# Patient Record
Sex: MALE | URBAN - METROPOLITAN AREA
[De-identification: names, ages, dates, MRNs, and addresses within clinical notes are randomized per-mention and may not be internally consistent; named-entity substitution may affect disease eponyms.]

---

## 2018-03-16 ENCOUNTER — HOSPITAL ENCOUNTER (EMERGENCY)
Facility: MEDICAL CENTER | Age: 58
End: 2018-03-17
Attending: EMERGENCY MEDICINE
Payer: MEDICARE

## 2018-03-16 VITALS
BODY MASS INDEX: 30.48 KG/M2 | DIASTOLIC BLOOD PRESSURE: 62 MMHG | OXYGEN SATURATION: 98 % | TEMPERATURE: 98 F | HEIGHT: 72 IN | WEIGHT: 225 LBS | SYSTOLIC BLOOD PRESSURE: 146 MMHG | RESPIRATION RATE: 20 BRPM | HEART RATE: 82 BPM

## 2018-03-16 DIAGNOSIS — F23 ACUTE PSYCHOSIS (HCC): ICD-10-CM

## 2018-03-16 PROCEDURE — 99283 EMERGENCY DEPT VISIT LOW MDM: CPT

## 2018-03-17 LAB — POC BREATHALIZER: 0.18 PERCENT (ref 0–0.01)

## 2018-03-17 PROCEDURE — 99283 EMERGENCY DEPT VISIT LOW MDM: CPT

## 2018-03-17 PROCEDURE — 302970 POC BREATHALIZER: Performed by: EMERGENCY MEDICINE

## 2018-03-17 NOTE — ED PROVIDER NOTES
ED Provider Note    ED Provider Note      Primary care provider: No primary care provider on file.    CHIEF COMPLAINT  Chief Complaint   Patient presents with   • Psych Eval     Pt. states needing help with mental health. Pt. unwilling to answer any other questions in triage. Pt. cornered triage RN in corner without answering any additional questions including SI/HI. Pt. smells heavily of ETOH. Pt. bib [facility]. Per report from [facility], pt. has hx of schizophrenia. Pt. uncooperative in triage room security called to triage to assist with pt. before pt. walked back into to ER lobby. Charge RN informed of incident. Pt. roomed in [facility].       HPI  Franklyn Braun is a 57 y.o. male who presents to the Emergency Department with chief complaint of  acute psychosis. Patient states history of schizophrenia has been off his medication for multiple months. Has been having increasing altered mental status he reports increasing audio hallucinations with [employer]ist voices. States the pain nontender no harm himself #him to harm anyone else however they are getting increasingly frequent. No visual hallucinations he denies any drug use. No fevers chills headache cough congestion chest pain shortness breath abdominal pain or any other acute complaint at this time.    REVIEW OF SYSTEMS  10 systems reviewed and otherwise negative, pertinent positives and negatives listed in the history of present illness.    PAST MEDICAL HISTORY   schizophrenia    SURGICAL HISTORY  patient denies any surgical history    SOCIAL HISTORY  Social History   Substance Use Topics   • Smoking status: Not on file   • Smokeless tobacco: Not on file   • Alcohol use Not on file      History   Drug use: Unknown       FAMILY HISTORY  Non-Contributory    CURRENT MEDICATIONS  Home Medications    **Home medications have not yet been reviewed for this encounter**         ALLERGIES  Not on File    PHYSICAL EXAM  VITAL SIGNS: /62   Pulse 82   Temp 36.7 °C (98 °F)    Resp 20   Ht 1.829 m (6')   Wt 102.1 kg (225 lb)   SpO2 98%   BMI 30.52 kg/m²   Pulse ox interpretation: I interpret this pulse ox as normal.  Constitutional: Alert and oriented x 3, minimal Distress  HEENT: Atraumatic normocephalic, pupils are equal round reactive to light extraocular movements are intact. The nares is clear, external ears are normal, mouth shows moist mucous membranes  Neck: Supple, no JVD no tracheal deviation  Cardiovascular: Regular rate and rhythm no murmur rub or gallop 2+ pulses peripherally x4  Thorax & Lungs: No respiratory distress, no wheezes rales or rhonchi, No chest tenderness.   GI: Soft nontender nondistended positive bowel sounds, no peritoneal signs  : Deferred  Rectal: Deferred  Skin: Warm dry no acute rash or lesion  Musculoskeletal: Moving all extremities with full range and 5 of 5 strength, no acute  deformity  Neurologic: Cranial nerves III through XII are grossly intact, no sensory deficit, no cerebellar dysfunction   Psychiatric: Pressured speech patterns somewhat rambling and tangential voicing excuse audio hallucinations not currently responding to any external stimuli.      DIAGNOSTIC STUDIES / PROCEDURES  The radiologist's interpretation of all radiological studies have been reviewed by me.    COURSE & MEDICAL DECISION MAKING  Pertinent Labs & Imaging studies reviewed. (See chart for details)    12:13 AM - Patient seen and examined at bedside.         Patient noted to have slightly elevated blood pressure likely circumstantial secondary to presenting complaint. Referred to primary care physician for further evaluation.     Patient was given IV fluids based on     Medical Decision Making: Patient is in no apparent distress at this time is somewhat rambling. States medication noncompliance claims that he was taking 30 mg of Haldol daily. Made plans for evaluation from behavioral health and possible reinitiation of antipsychotics. Patient refused to stay for  evaluation from behavioral health and chose to elope from the emergency department and did not give me a chance to discuss his treatment with him. Patient was slightly intoxicated however some study in his feet tolerating by mouth not suicidal or homicidal therefore I don't see any indication for legally holding him and was allowed to elope from the department at that time.    /62   Pulse 82   Temp 36.7 °C (98 °F)   Resp 20   Ht 1.829 m (6')   Wt 102.1 kg (225 lb)   SpO2 98%   BMI 30.52 kg/m²     FINAL IMPRESSION  1. Acute psychosis    2. Medication noncompliance      This dictation has been created using voice recognition software and/or scribes. The accuracy of the dictation is limited by the abilities of the software and the expertise of the scribes. I expect there may be some errors of grammar and possibly content. I made every attempt to manually correct the errors within my dictation. However, errors related to voice recognition software and/or scribes may still exist and should be interpreted within the appropriate context.

## 2018-03-17 NOTE — ED NOTES
Pt is agitated, asking for the doctor. Nurse notified him that doctor was with another pt but nurse would check on plan of care. Nurse notified provider but as provider came to talk with pt, pt had already left to front waiting room. Since pt is not SI/HI and not a danger to himself, provider said pt ok to leave without completing tx.

## 2018-03-17 NOTE — ED NOTES
"Pt blinking a lot and touching his face erratically, in hallway asking everyone to turn off his tv. Nurse asked why he was in ED. Pt said, \"I need your mental health resources.\" Nurse asked for what reason, what was wrong with pt today. Pt would not answer at first, continued to ask nurse to turn off tv. Nurse turned off tv, pt able to speak with nurse then. Said he has h/o schizophrenia and has been off his meds x8 months. Denies SI/HI, \"oh god, no!\" Asking for help with his meds. Nurse asked pt to lay down until provider comes to see him, turned off lights to decrease stimulation.   "

## 2018-03-17 NOTE — ED TRIAGE NOTES
Franklyn Braun  57 y.o. male  Chief Complaint   Patient presents with   • Psych Eval     Pt. states needing help with mental health. Pt. unwilling to answer any other questions in triage. Pt. cornered triage RN in corner without answering any additional questions including SI/HI. Pt. smells heavily of ETOH. Pt. bib [facility]. Per report from [facility], pt. has hx of schizophrenia. Pt. uncooperative in triage room security called to triage to assist with pt. before pt. walked back into to ER lobby. Charge RN informed of incident. Pt. roomed in [place] [address].   /62   Pulse 82   Temp 36.7 °C (98 °F)   Resp 20   Ht 1.829 m (6')   Wt 102.1 kg (225 lb)   SpO2 98%   BMI 30.52 kg/m²

## 2018-03-17 NOTE — ED NOTES
Pt continues to come to doorway. Nurse encouraging pt to lay down. Pt will comply but then come back to doorway. Pt now laying in bed.

## 2018-12-26 ENCOUNTER — HOSPITAL ENCOUNTER (EMERGENCY)
Dept: HOSPITAL 8 - ED | Age: 58
Discharge: HOME | End: 2018-12-26
Payer: MEDICARE

## 2018-12-26 VITALS — BODY MASS INDEX: 22.09 KG/M2 | HEIGHT: 70 IN | WEIGHT: 154.32 LBS

## 2018-12-26 VITALS — SYSTOLIC BLOOD PRESSURE: 128 MMHG | DIASTOLIC BLOOD PRESSURE: 70 MMHG

## 2018-12-26 DIAGNOSIS — F10.120: Primary | ICD-10-CM

## 2018-12-26 DIAGNOSIS — I25.2: ICD-10-CM

## 2018-12-26 DIAGNOSIS — Z72.9: ICD-10-CM

## 2018-12-26 PROCEDURE — 99283 EMERGENCY DEPT VISIT LOW MDM: CPT

## 2018-12-26 PROCEDURE — 80307 DRUG TEST PRSMV CHEM ANLYZR: CPT

## 2018-12-26 PROCEDURE — 36415 COLL VENOUS BLD VENIPUNCTURE: CPT

## 2019-02-01 ENCOUNTER — HOSPITAL ENCOUNTER (INPATIENT)
Dept: HOSPITAL 8 - ED | Age: 59
LOS: 1 days | Discharge: LEFT BEFORE DISCHARGE | DRG: 375 | End: 2019-02-02
Attending: HOSPITALIST | Admitting: HOSPITALIST
Payer: MEDICARE

## 2019-02-01 VITALS — DIASTOLIC BLOOD PRESSURE: 84 MMHG | SYSTOLIC BLOOD PRESSURE: 119 MMHG

## 2019-02-01 VITALS — BODY MASS INDEX: 26.89 KG/M2 | HEIGHT: 70 IN | WEIGHT: 187.83 LBS

## 2019-02-01 DIAGNOSIS — I25.2: ICD-10-CM

## 2019-02-01 DIAGNOSIS — K21.9: ICD-10-CM

## 2019-02-01 DIAGNOSIS — F17.210: ICD-10-CM

## 2019-02-01 DIAGNOSIS — K63.89: ICD-10-CM

## 2019-02-01 DIAGNOSIS — K56.609: ICD-10-CM

## 2019-02-01 DIAGNOSIS — F20.9: ICD-10-CM

## 2019-02-01 DIAGNOSIS — Z53.21: ICD-10-CM

## 2019-02-01 DIAGNOSIS — E03.9: ICD-10-CM

## 2019-02-01 DIAGNOSIS — E87.6: ICD-10-CM

## 2019-02-01 DIAGNOSIS — C18.9: Primary | ICD-10-CM

## 2019-02-01 DIAGNOSIS — E87.1: ICD-10-CM

## 2019-02-01 DIAGNOSIS — J42: ICD-10-CM

## 2019-02-01 LAB
ALBUMIN SERPL-MCNC: 3.6 G/DL (ref 3.4–5)
ALP SERPL-CCNC: 128 U/L (ref 45–117)
ALT SERPL-CCNC: 39 U/L (ref 12–78)
ANION GAP SERPL CALC-SCNC: 10 MMOL/L (ref 5–15)
BASOPHILS # BLD AUTO: 0.03 X10^3/UL (ref 0–0.1)
BASOPHILS NFR BLD AUTO: 0 % (ref 0–1)
BILIRUB SERPL-MCNC: 0.9 MG/DL (ref 0.2–1)
CALCIUM SERPL-MCNC: 8.8 MG/DL (ref 8.5–10.1)
CHLORIDE SERPL-SCNC: 95 MMOL/L (ref 98–107)
CREAT SERPL-MCNC: 0.89 MG/DL (ref 0.7–1.3)
EOSINOPHIL # BLD AUTO: 0.04 X10^3/UL (ref 0–0.4)
EOSINOPHIL NFR BLD AUTO: 0 % (ref 1–7)
ERYTHROCYTE [DISTWIDTH] IN BLOOD BY AUTOMATED COUNT: 14.3 % (ref 9.4–14.8)
EST. AVERAGE GLUCOSE BLD GHB EST-MCNC: 97 MG/DL (ref 0–126)
HBA1C MFR BLD: 5 % (ref 4.2–6.3)
LYMPHOCYTES # BLD AUTO: 1.65 X10^3/UL (ref 1–3.4)
LYMPHOCYTES NFR BLD AUTO: 14 % (ref 22–44)
MCH RBC QN AUTO: 35.5 PG (ref 27.5–34.5)
MCHC RBC AUTO-ENTMCNC: 35.2 G/DL (ref 33.2–36.2)
MCV RBC AUTO: 100.8 FL (ref 81–97)
MD: (no result)
MONOCYTES # BLD AUTO: 1.62 X10^3/UL (ref 0.2–0.8)
MONOCYTES NFR BLD AUTO: 14 % (ref 2–9)
NEUTROPHILS # BLD AUTO: 8.11 X10^3/UL (ref 1.8–6.8)
NEUTROPHILS NFR BLD AUTO: 71 % (ref 42–75)
PLATELET # BLD AUTO: 213 X10^3/UL (ref 130–400)
PMV BLD AUTO: 9.5 FL (ref 7.4–10.4)
PROT SERPL-MCNC: 8.1 G/DL (ref 6.4–8.2)
RBC # BLD AUTO: 4.35 X10^6/UL (ref 4.38–5.82)
T4 FREE SERPL-MCNC: 1.21 NG/DL (ref 0.76–1.46)
TSH SERPL-ACNC: 3.86 MIU/L (ref 0.36–3.74)

## 2019-02-01 PROCEDURE — 74021 RADEX ABDOMEN 3+ VIEWS: CPT

## 2019-02-01 PROCEDURE — 99285 EMERGENCY DEPT VISIT HI MDM: CPT

## 2019-02-01 PROCEDURE — 36415 COLL VENOUS BLD VENIPUNCTURE: CPT

## 2019-02-01 PROCEDURE — 80053 COMPREHEN METABOLIC PANEL: CPT

## 2019-02-01 PROCEDURE — 84100 ASSAY OF PHOSPHORUS: CPT

## 2019-02-01 PROCEDURE — 83036 HEMOGLOBIN GLYCOSYLATED A1C: CPT

## 2019-02-01 PROCEDURE — 83690 ASSAY OF LIPASE: CPT

## 2019-02-01 PROCEDURE — 83735 ASSAY OF MAGNESIUM: CPT

## 2019-02-01 PROCEDURE — 84439 ASSAY OF FREE THYROXINE: CPT

## 2019-02-01 PROCEDURE — 83930 ASSAY OF BLOOD OSMOLALITY: CPT

## 2019-02-01 PROCEDURE — 84443 ASSAY THYROID STIM HORMONE: CPT

## 2019-02-01 PROCEDURE — 85025 COMPLETE CBC W/AUTO DIFF WBC: CPT

## 2019-02-01 PROCEDURE — 74177 CT ABD & PELVIS W/CONTRAST: CPT

## 2019-02-01 PROCEDURE — 80061 LIPID PANEL: CPT

## 2019-02-01 PROCEDURE — 82378 CARCINOEMBRYONIC ANTIGEN: CPT

## 2019-02-01 RX ADMIN — FAMOTIDINE SCH MG: 10 INJECTION INTRAVENOUS at 23:04

## 2019-02-01 RX ADMIN — MORPHINE SULFATE PRN MG: 10 INJECTION INTRAVENOUS at 23:04

## 2019-02-01 NOTE — NUR
ATTEMPTED THREES TIMES TO PASS NG TUBE BUT PT NOT ABLE TO TOLERATE. PT REPORTS 
HE GAGS AND IS NOT ABLE TO GET IT PAST THE BACK OF THE THROAT. DR. WIGGINS 
AWARE AND WILL TELL SURGEON WE WERE NOT ABLE TO PASS NG.

## 2019-02-01 NOTE — NUR
PT C/O ABD PAIN FOR 2 DAYS WITH NO BM FOR 3 DAYS. PT ALSO C/O N/V FOR 2 DAYS AS 
WELL. ABDOMEN IS DISTENEDED AND PT DRINKS 6 BEERS A DAY.

## 2019-02-02 VITALS — SYSTOLIC BLOOD PRESSURE: 137 MMHG | DIASTOLIC BLOOD PRESSURE: 87 MMHG

## 2019-02-02 VITALS — SYSTOLIC BLOOD PRESSURE: 128 MMHG | DIASTOLIC BLOOD PRESSURE: 84 MMHG

## 2019-02-02 VITALS — DIASTOLIC BLOOD PRESSURE: 81 MMHG | SYSTOLIC BLOOD PRESSURE: 137 MMHG

## 2019-02-02 LAB
ALBUMIN SERPL-MCNC: 3.4 G/DL (ref 3.4–5)
ALP SERPL-CCNC: 125 U/L (ref 45–117)
ALT SERPL-CCNC: 36 U/L (ref 12–78)
ANION GAP SERPL CALC-SCNC: 8 MMOL/L (ref 5–15)
BASOPHILS # BLD AUTO: 0.02 X10^3/UL (ref 0–0.1)
BASOPHILS NFR BLD AUTO: 0 % (ref 0–1)
BILIRUB SERPL-MCNC: 0.9 MG/DL (ref 0.2–1)
CALCIUM SERPL-MCNC: 8.5 MG/DL (ref 8.5–10.1)
CHLORIDE SERPL-SCNC: 97 MMOL/L (ref 98–107)
CHOL/HDL RATIO: 2.5
CREAT SERPL-MCNC: 0.91 MG/DL (ref 0.7–1.3)
EOSINOPHIL # BLD AUTO: 0 X10^3/UL (ref 0–0.4)
EOSINOPHIL NFR BLD AUTO: 0 % (ref 1–7)
ERYTHROCYTE [DISTWIDTH] IN BLOOD BY AUTOMATED COUNT: 14 % (ref 9.4–14.8)
HDL CHOL %: 40 % (ref 26–37)
HDL CHOLESTEROL (DIRECT): 49 MG/DL (ref 40–60)
LDL CHOLESTEROL,CALCULATED: 56 MG/DL (ref 54–169)
LDLC/HDLC SERPL: 1.1 {RATIO} (ref 0.5–3)
LYMPHOCYTES # BLD AUTO: 0.78 X10^3/UL (ref 1–3.4)
LYMPHOCYTES NFR BLD AUTO: 7 % (ref 22–44)
MCH RBC QN AUTO: 35.7 PG (ref 27.5–34.5)
MCHC RBC AUTO-ENTMCNC: 35.5 G/DL (ref 33.2–36.2)
MCV RBC AUTO: 100.5 FL (ref 81–97)
MD: (no result)
MONOCYTES # BLD AUTO: 1.47 X10^3/UL (ref 0.2–0.8)
MONOCYTES NFR BLD AUTO: 14 % (ref 2–9)
NEUTROPHILS # BLD AUTO: 8.26 X10^3/UL (ref 1.8–6.8)
NEUTROPHILS NFR BLD AUTO: 78 % (ref 42–75)
PLATELET # BLD AUTO: 205 X10^3/UL (ref 130–400)
PMV BLD AUTO: 10 FL (ref 7.4–10.4)
PROT SERPL-MCNC: 7.7 G/DL (ref 6.4–8.2)
RBC # BLD AUTO: 4.44 X10^6/UL (ref 4.38–5.82)
TRIGL SERPL-MCNC: 94 MG/DL (ref 50–200)
VLDLC SERPL CALC-MCNC: 19 MG/DL (ref 0–25)

## 2019-02-02 RX ADMIN — DEXTROSE, SODIUM CHLORIDE, AND POTASSIUM CHLORIDE SCH MLS/HR: 5; .9; .15 INJECTION INTRAVENOUS at 00:08

## 2019-02-02 RX ADMIN — FAMOTIDINE SCH MG: 10 INJECTION INTRAVENOUS at 09:32

## 2019-02-02 RX ADMIN — MORPHINE SULFATE PRN MG: 10 INJECTION INTRAVENOUS at 09:42

## 2019-02-02 RX ADMIN — DEXTROSE, SODIUM CHLORIDE, AND POTASSIUM CHLORIDE SCH MLS/HR: 5; .9; .15 INJECTION INTRAVENOUS at 15:00
